# Patient Record
Sex: FEMALE | Race: WHITE | NOT HISPANIC OR LATINO | URBAN - METROPOLITAN AREA
[De-identification: names, ages, dates, MRNs, and addresses within clinical notes are randomized per-mention and may not be internally consistent; named-entity substitution may affect disease eponyms.]

---

## 2018-10-15 ENCOUNTER — APPOINTMENT (RX ONLY)
Dept: URBAN - METROPOLITAN AREA CLINIC 37 | Facility: CLINIC | Age: 28
Setting detail: DERMATOLOGY
End: 2018-10-15

## 2018-10-15 ENCOUNTER — APPOINTMENT (RX ONLY)
Dept: URBAN - METROPOLITAN AREA CLINIC 38 | Facility: CLINIC | Age: 28
Setting detail: DERMATOLOGY
End: 2018-10-15

## 2018-10-15 DIAGNOSIS — L65.8 OTHER SPECIFIED NONSCARRING HAIR LOSS: ICD-10-CM

## 2018-10-15 DIAGNOSIS — L65.0 TELOGEN EFFLUVIUM: ICD-10-CM

## 2018-10-15 PROCEDURE — ? VENIPUNCTURE

## 2018-10-15 PROCEDURE — ? ADDITIONAL NOTES

## 2018-10-15 PROCEDURE — 99202 OFFICE O/P NEW SF 15 MIN: CPT | Mod: 25

## 2018-10-15 PROCEDURE — 36415 COLL VENOUS BLD VENIPUNCTURE: CPT

## 2018-10-15 PROCEDURE — ? ORDER TESTS

## 2018-10-15 PROCEDURE — ? COUNSELING

## 2018-10-15 ASSESSMENT — LOCATION SIMPLE DESCRIPTION DERM
LOCATION SIMPLE: RIGHT SCALP
LOCATION SIMPLE: LEFT FOREHEAD
LOCATION SIMPLE: RIGHT SCALP
LOCATION SIMPLE: LEFT FOREHEAD

## 2018-10-15 ASSESSMENT — LOCATION DETAILED DESCRIPTION DERM
LOCATION DETAILED: RIGHT MEDIAL FRONTAL SCALP
LOCATION DETAILED: LEFT SUPERIOR MEDIAL FOREHEAD
LOCATION DETAILED: RIGHT MEDIAL FRONTAL SCALP
LOCATION DETAILED: LEFT SUPERIOR MEDIAL FOREHEAD

## 2018-10-15 ASSESSMENT — LOCATION ZONE DERM
LOCATION ZONE: SCALP
LOCATION ZONE: FACE
LOCATION ZONE: FACE
LOCATION ZONE: SCALP

## 2018-10-15 NOTE — PROCEDURE: COUNSELING
Patient Specific Counseling (Will Not Stick From Patient To Patient): Suspect some cyclical shedding as well things like starting and stopping her BCP may be triggers.
Patient Specific Counseling (Will Not Stick From Patient To Patient): No great treatment for a woman who is planning to conceive. Could consider starting Minoxidil after she is done with pregnancies.
Detail Level: Detailed
Detail Level: Zone

## 2018-10-15 NOTE — PROCEDURE: MIPS QUALITY
Quality 110: Preventive Care And Screening: Influenza Immunization: Influenza Immunization not Administered for Documented Reasons.
Quality 130: Documentation Of Current Medications In The Medical Record: Current Medications Documented
Quality 226: Preventive Care And Screening: Tobacco Use: Screening And Cessation Intervention: Patient screened for tobacco and never smoked
Quality 431: Preventive Care And Screening: Unhealthy Alcohol Use - Screening: Patient screened for unhealthy alcohol use using a single question and scores less than 2 times per year
Additional Notes: Patient declined the flu vaccine for personal reasons.
Detail Level: Detailed

## 2018-10-15 NOTE — HPI: HAIR LOSS
Previous Labs: No
How Did The Hair Loss Occur?: gradual in onset
How Severe Is Your Hair Loss?: mild
What Hair Products Do You Use?: Johan
Additional History: Patient is trying to get pregnant and stopped her Ortho-Tricycin in May 5th and her shedding is constant.

## 2018-10-15 NOTE — PROCEDURE: ORDER TESTS
Bill For Surgical Tray: no
Expected Date Of Service: 10/15/2018
Billing Type: Third-Party Bill
Lab Facility: [id number]
Performing Laboratory: [id number]

## 2018-10-15 NOTE — HPI: HAIR LOSS
Previous Labs: No
How Did The Hair Loss Occur?: gradual in onset
How Severe Is Your Hair Loss?: mild
What Hair Products Do You Use?: Rin
Additional History: Patient is trying to get pregnant and stopped her Ortho-Tricycin in May 5th and her shedding is constant.

## 2018-10-15 NOTE — PROCEDURE: MIPS QUALITY
Quality 226: Preventive Care And Screening: Tobacco Use: Screening And Cessation Intervention: Patient screened for tobacco and never smoked
Quality 431: Preventive Care And Screening: Unhealthy Alcohol Use - Screening: Patient screened for unhealthy alcohol use using a single question and scores less than 2 times per year
Additional Notes: Patient declined the flu vaccine for personal reasons.
Detail Level: Detailed
Quality 110: Preventive Care And Screening: Influenza Immunization: Influenza Immunization not Administered for Documented Reasons.
Quality 130: Documentation Of Current Medications In The Medical Record: Current Medications Documented

## 2018-10-15 NOTE — PROCEDURE: ADDITIONAL NOTES
Additional Notes: We discussed that she maybe prone to hereditary hair loss. She was told that since she has iron issues we will take labs today to make sure the prenatal medications has enough iron.
Detail Level: Simple

## 2018-10-15 NOTE — PROCEDURE: VENIPUNCTURE
Venipuncture Paragraph: An alcohol pad was applied to the venipuncture site. Venipuncture was performed using a butterfly needle. Pressure and a bandaid was applied to the site. No complications were noted.
Detail Level: None
Bill For Individual Tests Below?: no
Number Of Tubes Drawn: 2

## 2019-03-14 ENCOUNTER — APPOINTMENT (RX ONLY)
Dept: URBAN - METROPOLITAN AREA CLINIC 38 | Facility: CLINIC | Age: 29
Setting detail: DERMATOLOGY
End: 2019-03-14

## 2019-03-14 ENCOUNTER — APPOINTMENT (RX ONLY)
Dept: URBAN - METROPOLITAN AREA CLINIC 37 | Facility: CLINIC | Age: 29
Setting detail: DERMATOLOGY
End: 2019-03-14

## 2019-03-14 DIAGNOSIS — I78.1 NEVUS, NON-NEOPLASTIC: ICD-10-CM

## 2019-03-14 PROCEDURE — 99212 OFFICE O/P EST SF 10 MIN: CPT

## 2019-03-14 PROCEDURE — ? COUNSELING

## 2019-03-14 PROCEDURE — ? ADDITIONAL NOTES

## 2019-03-14 ASSESSMENT — LOCATION SIMPLE DESCRIPTION DERM
LOCATION SIMPLE: NOSE
LOCATION SIMPLE: NOSE

## 2019-03-14 ASSESSMENT — LOCATION DETAILED DESCRIPTION DERM
LOCATION DETAILED: NASAL SUPRATIP
LOCATION DETAILED: NASAL SUPRATIP

## 2019-03-14 ASSESSMENT — LOCATION ZONE DERM
LOCATION ZONE: NOSE
LOCATION ZONE: NOSE

## 2019-03-14 NOTE — PROCEDURE: MIPS QUALITY
Quality 431: Preventive Care And Screening: Unhealthy Alcohol Use - Screening: Patient screened for unhealthy alcohol use using a single question and scores less than 2 times per year
Additional Notes: Patient declined the flu vaccine for personal reasons.
Detail Level: Detailed
Quality 130: Documentation Of Current Medications In The Medical Record: Current Medications Documented
Quality 110: Preventive Care And Screening: Influenza Immunization: Influenza Immunization Administered during Influenza season
Quality 226: Preventive Care And Screening: Tobacco Use: Screening And Cessation Intervention: Patient screened for tobacco use and is an ex/non-smoker

## 2019-03-14 NOTE — HPI: EVALUATION OF SKIN LESION(S)
What Type Of Note Output Would You Prefer (Optional)?: Bullet Format
How Severe Are Your Spot(S)?: mild
Have Your Spot(S) Been Treated In The Past?: has not been treated
Hpi Title: Evaluation of a Skin Lesion
Additional History: Pt states she has a “red dot” on the top of her nose that has been present for about a year. She feels that it is getting bigger and more prominent.   She is 14 weeks pregnant.
Family Member: Uncle

## 2019-03-14 NOTE — PROCEDURE: ADDITIONAL NOTES
Additional Notes: I advised waiting until after pregnancy and the breastfeeding period to have this cosmetically treated if that is what she ultimately desires.  Her vascularity will increase tremendously during pregnancy, and the hormones also create new vessels, so best to wait until everything has settled physiologically for the best outcome.  This doesn't bother her tremendously, so she said she is going to keep it covered with makeup until she is done breastfeeding.
Detail Level: Simple

## 2019-03-14 NOTE — PROCEDURE: MIPS QUALITY
Detail Level: Detailed
Quality 130: Documentation Of Current Medications In The Medical Record: Current Medications Documented
Quality 431: Preventive Care And Screening: Unhealthy Alcohol Use - Screening: Patient screened for unhealthy alcohol use using a single question and scores less than 2 times per year
Additional Notes: Patient declined the flu vaccine for personal reasons.
Quality 226: Preventive Care And Screening: Tobacco Use: Screening And Cessation Intervention: Patient screened for tobacco use and is an ex/non-smoker
Quality 110: Preventive Care And Screening: Influenza Immunization: Influenza Immunization Administered during Influenza season

## 2019-03-14 NOTE — HPI: EVALUATION OF SKIN LESION(S)
What Type Of Note Output Would You Prefer (Optional)?: Bullet Format
Hpi Title: Evaluation of a Skin Lesion
How Severe Are Your Spot(S)?: mild
Have Your Spot(S) Been Treated In The Past?: has not been treated
Family Member: Uncle
Additional History: Pt states she has a “red dot” on the top of her nose that has been present for about a year. She feels that it is getting bigger and more prominent.   She is 14 weeks pregnant.